# Patient Record
Sex: MALE | HISPANIC OR LATINO | Employment: FULL TIME | URBAN - METROPOLITAN AREA
[De-identification: names, ages, dates, MRNs, and addresses within clinical notes are randomized per-mention and may not be internally consistent; named-entity substitution may affect disease eponyms.]

---

## 2023-04-20 ENCOUNTER — HOSPITAL ENCOUNTER (EMERGENCY)
Facility: MEDICAL CENTER | Age: 58
End: 2023-04-20
Attending: EMERGENCY MEDICINE
Payer: COMMERCIAL

## 2023-04-20 ENCOUNTER — APPOINTMENT (OUTPATIENT)
Dept: RADIOLOGY | Facility: MEDICAL CENTER | Age: 58
End: 2023-04-20
Attending: EMERGENCY MEDICINE
Payer: COMMERCIAL

## 2023-04-20 VITALS
WEIGHT: 198.85 LBS | OXYGEN SATURATION: 96 % | BODY MASS INDEX: 30.14 KG/M2 | RESPIRATION RATE: 19 BRPM | TEMPERATURE: 98 F | SYSTOLIC BLOOD PRESSURE: 119 MMHG | HEART RATE: 81 BPM | HEIGHT: 68 IN | DIASTOLIC BLOOD PRESSURE: 75 MMHG

## 2023-04-20 DIAGNOSIS — T14.8XXA BRUISING: ICD-10-CM

## 2023-04-20 DIAGNOSIS — S70.02XA CONTUSION OF LEFT HIP, INITIAL ENCOUNTER: ICD-10-CM

## 2023-04-20 PROCEDURE — 99283 EMERGENCY DEPT VISIT LOW MDM: CPT

## 2023-04-20 PROCEDURE — 73501 X-RAY EXAM HIP UNI 1 VIEW: CPT | Mod: LT

## 2023-04-20 PROCEDURE — 72100 X-RAY EXAM L-S SPINE 2/3 VWS: CPT

## 2023-04-20 NOTE — ED TRIAGE NOTES
Chief Complaint   Patient presents with    Hip Pain     Patient reports 4/17 he slipped of a curb landing on left hip. Patient reports went to hospital in [place] and was given Naproxen but it is not helping. Patient has noticeable bruising to hip area. Patient reports pain 1/10.

## 2023-04-20 NOTE — ED PROVIDER NOTES
"ER Provider Note    Scribed for Dr. Vince Daugherty M.D. by Pop Goins. 4/20/2023  1:12 AM    Primary Care Provider: Pcp Pt States None    CHIEF COMPLAINT  Chief Complaint   Patient presents with    Hip Pain     Patient reports 4/17 he slipped of a curb landing on left hip. Patient reports went to hospital in [place] and was given Naproxen but it is not helping. Patient has noticeable bruising to hip area. Patient reports pain 1/10.        EXTERNAL RECORDS REVIEWED  Inpatient Notes Last seen 10 years ago for a facial laceration    HPI/ROS    LIMITATION TO HISTORY   Select: : None    Yoel Orellana is a 57 y.o. male who presents to the ED for left hip pain onset 3 days ago. He states that he slipped and fell on a curb, landing on his left hip and back. He presented to a hospital in [place] where no images were done, he was given pain medications and discharged. He was able to walk following discharge. The pain meds did not alleviate his pain and it got worse, so he presented here. He denies any head trauma following to incident or loss of consciousness.    PAST MEDICAL HISTORY  History reviewed. No pertinent past medical history.    SURGICAL HISTORY  History reviewed. No pertinent surgical history.    FAMILY HISTORY  History reviewed. No pertinent family history.    SOCIAL HISTORY   reports that he has never smoked. He does not have any smokeless tobacco history on file. He reports current alcohol use. He reports that he does not use drugs.    CURRENT MEDICATIONS  No current outpatient medications     ALLERGIES  Pollen extract    PHYSICAL EXAM  /85   Pulse 83   Temp 36.9 °C (98.5 °F) (Temporal)   Resp 18   Ht 1.727 m (5' 8\")   Wt 90.2 kg (198 lb 13.7 oz)   SpO2 100%   BMI 30.24 kg/m²   Constitutional: Alert in no apparent distress.  HENT: No signs of trauma, Bilateral external ears normal, Nose normal.   Eyes: Pupils are equal and reactive, Conjunctiva normal, Non-icteric.   Neck: Normal " range of motion, No tenderness, Supple, No stridor.   Lymphatic: No lymphadenopathy noted.   Cardiovascular: Regular rate and rhythm, no murmurs.   Thorax & Lungs: Normal breath sounds, No respiratory distress, No wheezing, No chest tenderness.   Abdomen: Bowel sounds normal, Soft, No tenderness, No masses, No pulsatile masses. No peritoneal signs.  Skin: Warm, Dry, No erythema, No rash.   Back: No bony tenderness, No CVA tenderness.   Extremities: Intact distal pulses, No edema, No tenderness, No cyanosis.  Musculoskeletal: Tenderness over left hip, ecchymosis around left hip to mid lumbar spine, tenderness over L-spine. Good range of motion in all major joints. No major deformities noted.   Neurologic: Alert , Normal motor function, Normal sensory function, No focal deficits noted.   Psychiatric: Affect normal, Judgment normal, Mood normal.     DIAGNOSTIC STUDIES & PROCEDURES    Radiology:   The attending Emergency Physician has independently interpreted the diagnostic imaging associated with this visit and is awaiting the final reading from the radiologist, which will be displayed below.  Preliminary interpretation is a follows: No fractures noted  Radiologist interpretation:     DX-HIP-UNILATERAL-WITH PELVIS-1 VIEW LEFT   Final Result         1.  No radiographic evidence of acute traumatic injury.      DX-LUMBAR SPINE-2 OR 3 VIEWS   Final Result         1.  No acute traumatic bony injury of the lumbar spine.           COURSE & MEDICAL DECISION MAKING    ED Observation Status? Yes; I am placing the patient in to an observation status due to a diagnostic uncertainty as well as therapeutic intensity. Patient placed in observation status at 1:31 AM, 4/20/2023.     Observation plan is as follows: Monitor for symptom management and diagnostic results     Upon Reevaluation, the patient's condition has: Improved; and will be discharged.    Patient discharged from ED Observation status at 2:28 AM (Time) 4/20/2023 (Date).      INITIAL ASSESSMENT AND PLAN  Care Narrative:       1:12 AM - Patient seen and evaluated at bedside. Discussed plan of care, including imaging. Patient agrees to plan of care. Ordered DX-L-spine and DX-left hip to evaluate. Patient verbalizes understanding and agreement to this plan of care.      2:29 AM - Patient was reevaluated at bedside. Discussed radiology results with the patient and informed them that there are no fractures present.      ADDITIONAL PROBLEM LIST AND DISPOSITION  Patient with fall on a curb landing on hip.  There is also pain in the back.  We will get x-rays of these areas.  There is some ecchymosis.  The patient's vital signs are stable.    There is no fracture noted in the patient's hip and there is no fracture noted in the patient's back.  At this time the patient has pain medication is prescribed to him.  We will discharge him home with strict return precautions and follow-up.                   DISPOSITION AND DISCUSSIONS    Discussion of management with other Q or appropriate source(s): None     Escalation of care considered, and ultimately not performed: blood analysis.    Barriers to care at this time, including but not limited to: Patient does not have established PCP.     DISPOSITION:  Patient will be discharged home in stable condition.    FOLLOW UP:  No follow-up provider specified.    FINAL IMPRESSION   1. Contusion of left hip, initial encounter    2. Bruising      Pop ROBLERO (Yenny), am scribing for, and in the presence of, Vince Daugherty M.D..    Electronically signed by: Pop Goins (Yenny), 4/20/2023    Vince ROBLERO M.D. personally performed the services described in this documentation, as scribed by Pop Goins in my presence, and it is both accurate and complete.    The note accurately reflects work and decisions made by me.  Vince Daugherty M.D.  4/20/2023  5:07 AM